# Patient Record
Sex: FEMALE | Race: WHITE | NOT HISPANIC OR LATINO | URBAN - METROPOLITAN AREA
[De-identification: names, ages, dates, MRNs, and addresses within clinical notes are randomized per-mention and may not be internally consistent; named-entity substitution may affect disease eponyms.]

---

## 2019-12-10 ENCOUNTER — IMPORTED ENCOUNTER (OUTPATIENT)
Dept: URBAN - METROPOLITAN AREA CLINIC 31 | Facility: CLINIC | Age: 19
End: 2019-12-10

## 2019-12-10 PROBLEM — H40.013: Noted: 2019-12-10

## 2019-12-10 PROCEDURE — 92004 COMPRE OPH EXAM NEW PT 1/>: CPT

## 2019-12-10 PROCEDURE — 92015 DETERMINE REFRACTIVE STATE: CPT

## 2019-12-10 NOTE — PATIENT DISCUSSION
1.  Glaucoma suspect OU - Cupping OS/OD. Get OCT and follow with [facility] here to go over results. 2. Refractive error Change glasses.

## 2019-12-16 ENCOUNTER — IMPORTED ENCOUNTER (OUTPATIENT)
Dept: URBAN - METROPOLITAN AREA CLINIC 31 | Facility: CLINIC | Age: 19
End: 2019-12-16

## 2019-12-16 PROCEDURE — 92133 CPTRZD OPH DX IMG PST SGM ON: CPT

## 2019-12-17 ENCOUNTER — IMPORTED ENCOUNTER (OUTPATIENT)
Dept: URBAN - METROPOLITAN AREA CLINIC 31 | Facility: CLINIC | Age: 19
End: 2019-12-17

## 2019-12-17 PROBLEM — H40.013: Noted: 2019-12-17

## 2019-12-17 PROCEDURE — 99213 OFFICE O/P EST LOW 20 MIN: CPT

## 2019-12-17 NOTE — PATIENT DISCUSSION
Glaucoma suspect OU - No signs of glaucoma starting based on todays examination and testing. Will continue to monitor for glaucoma development. Explained normal OCT and IOP. No glc as of now.

## 2022-04-02 ASSESSMENT — TONOMETRY
OS_IOP_MMHG: 17
OS_IOP_MMHG: 15
OD_IOP_MMHG: 17
OD_IOP_MMHG: 14

## 2022-04-02 ASSESSMENT — VISUAL ACUITY
OS_SC: 20/20
OS_SC: 20/20-1
OD_SC: 20/20
OD_SC: 20/20
OD_CC: 20/400
OS_SC: J1+15''
OD_SC: J1+15''
OS_CC: 20/50